# Patient Record
Sex: MALE | Race: OTHER | NOT HISPANIC OR LATINO | URBAN - METROPOLITAN AREA
[De-identification: names, ages, dates, MRNs, and addresses within clinical notes are randomized per-mention and may not be internally consistent; named-entity substitution may affect disease eponyms.]

---

## 2024-03-19 ENCOUNTER — OFFICE VISIT (OUTPATIENT)
Dept: URGENT CARE | Facility: CLINIC | Age: 29
End: 2024-03-19
Payer: COMMERCIAL

## 2024-03-19 VITALS
RESPIRATION RATE: 18 BRPM | BODY MASS INDEX: 28.32 KG/M2 | WEIGHT: 170 LBS | SYSTOLIC BLOOD PRESSURE: 125 MMHG | OXYGEN SATURATION: 97 % | TEMPERATURE: 98 F | HEIGHT: 65 IN | DIASTOLIC BLOOD PRESSURE: 79 MMHG | HEART RATE: 94 BPM

## 2024-03-19 DIAGNOSIS — R10.13 EPIGASTRIC PAIN: ICD-10-CM

## 2024-03-19 DIAGNOSIS — R07.9 CHEST PAIN, UNSPECIFIED TYPE: Primary | ICD-10-CM

## 2024-03-19 PROCEDURE — 93000 ELECTROCARDIOGRAM COMPLETE: CPT | Mod: S$GLB,,, | Performed by: EMERGENCY MEDICINE

## 2024-03-19 PROCEDURE — 99204 OFFICE O/P NEW MOD 45 MIN: CPT | Mod: S$GLB,,, | Performed by: NURSE PRACTITIONER

## 2024-03-19 RX ORDER — LIDOCAINE HYDROCHLORIDE 20 MG/ML
5 SOLUTION OROPHARYNGEAL ONCE
Status: COMPLETED | OUTPATIENT
Start: 2024-03-19 | End: 2024-03-19

## 2024-03-19 RX ORDER — ALUMINUM HYDROXIDE, MAGNESIUM HYDROXIDE, AND SIMETHICONE 1200; 120; 1200 MG/30ML; MG/30ML; MG/30ML
30 SUSPENSION ORAL
Status: COMPLETED | OUTPATIENT
Start: 2024-03-19 | End: 2024-03-19

## 2024-03-19 RX ADMIN — ALUMINUM HYDROXIDE, MAGNESIUM HYDROXIDE, AND SIMETHICONE 30 ML: 1200; 120; 1200 SUSPENSION ORAL at 05:03

## 2024-03-19 RX ADMIN — LIDOCAINE HYDROCHLORIDE 5 ML: 20 SOLUTION OROPHARYNGEAL at 05:03

## 2024-03-19 NOTE — PROGRESS NOTES
"Subjective:      Patient ID: Daryl Irizarry is a 29 y.o. male.    Vitals:  height is 5' 5" (1.651 m) and weight is 77.1 kg (170 lb). His temperature is 97.9 °F (36.6 °C). His blood pressure is 125/79 and his pulse is 94. His respiration is 18 and oxygen saturation is 97%.     Chief Complaint: Chest Pain    B1 year old male presents with chest pain and epigastric pain that started yesterday.  He states that his pain as an 8 or 9/10.  He denies any shortness a breath.  He states it hurts when he moves or bends.  He denies any vomiting or diarrhea or bloody stool.  Denies any fever cough or congestion.      Chest Pain   This is a new problem. The current episode started yesterday. The onset quality is sudden. The problem occurs intermittently. The problem has been unchanged. The pain is at a severity of 9/10. The pain is severe. The quality of the pain is described as stabbing. The pain does not radiate. Associated symptoms include abdominal pain. Pertinent negatives include no cough, diaphoresis, dizziness, fever, headaches, nausea, shortness of breath, sputum production or vomiting. The pain is aggravated by movement. He has tried nothing for the symptoms. The treatment provided no relief.       Constitution: Negative for chills, sweating, fatigue, fever and generalized weakness.   HENT:  Negative for ear pain and sore throat.    Neck: Negative for neck pain and neck stiffness.   Cardiovascular:  Positive for chest pain.   Respiratory:  Negative for cough, sputum production and shortness of breath.    Gastrointestinal:  Positive for abdominal pain. Negative for nausea, vomiting and diarrhea.   Genitourinary:  Negative for dysuria.   Musculoskeletal:  Negative for pain.   Skin:  Negative for color change and rash.   Neurological:  Negative for dizziness and headaches.      Objective:     Physical Exam   Constitutional: He is oriented to person, place, and time. He appears well-developed. He is cooperative.  Non-toxic " appearance. He does not appear ill. No distress.   HENT:   Head: Normocephalic and atraumatic.   Ears:   Right Ear: Hearing, tympanic membrane, external ear and ear canal normal.   Left Ear: Hearing, tympanic membrane, external ear and ear canal normal.   Nose: Nose normal. No mucosal edema, rhinorrhea or nasal deformity. No epistaxis. Right sinus exhibits no maxillary sinus tenderness and no frontal sinus tenderness. Left sinus exhibits no maxillary sinus tenderness and no frontal sinus tenderness.   Mouth/Throat: Uvula is midline, oropharynx is clear and moist and mucous membranes are normal. No trismus in the jaw. Normal dentition. No uvula swelling. No oropharyngeal exudate, posterior oropharyngeal edema or posterior oropharyngeal erythema.   Eyes: Conjunctivae and lids are normal. No scleral icterus.   Neck: Trachea normal and phonation normal. Neck supple. No edema present. No erythema present. No neck rigidity present.   Cardiovascular: Normal rate, regular rhythm, normal heart sounds and normal pulses.   Pulmonary/Chest: Effort normal and breath sounds normal. No stridor. No respiratory distress. He has no decreased breath sounds. He has no wheezes. He has no rhonchi. He has no rales.             Comments: No adventitious lung sounds    Abdominal: Normal appearance. He exhibits no distension. There is no abdominal tenderness.   Musculoskeletal: Normal range of motion.         General: No deformity. Normal range of motion.   Neurological: He is alert and oriented to person, place, and time. He exhibits normal muscle tone. Coordination normal.   Skin: Skin is warm, dry, intact, not diaphoretic and not pale.   Psychiatric: His speech is normal and behavior is normal. Judgment and thought content normal.   Nursing note and vitals reviewed.      Assessment:     1. Chest pain, unspecified type    2. Epigastric pain        Plan:     EKG - No ST changes, NSR  GI cocktail given- no relief    29-year-old male presents  with chest pain and epigastric pain that started yesterday.  He states his symptoms have worsened over the last 24 hours.  EKG has no ST changes.  Normal sinus rhythm.  Was given a GI cocktail with no relief of symptoms.  He is reporting 8 or 9 out of 10 pain to his epigastric area and into his chest.  Have advised him to have further evaluation in the emergency room. He declined going to the emergency room.  He understands that this could result in death.  All questions answered.    Chest pain, unspecified type    Epigastric pain    Other orders  -     LIDOcaine viscous HCl 2% oral solution 5 mL  -     aluminum-magnesium hydroxide-simethicone 200-200-20 mg/5 mL suspension 30 mL

## 2024-03-19 NOTE — LETTER
March 19, 2024      [facility]  [address]  [facility] [address]  Phone: [phone]       Patient: Daryl Irizarry   YOB: 1994  Date of Visit: 03/19/2024    To Whom It May Concern:    Meli Irizarry  was at [facility] on 03/19/2024. I have not cleared him to return to work. He was advised to have further evaluation for with clearance to return to work. If you have any questions or concerns, or if I can be of further assistance, please do not hesitate to contact me.    Sincerely,    Ankita Toro NP